# Patient Record
Sex: FEMALE | Race: BLACK OR AFRICAN AMERICAN | NOT HISPANIC OR LATINO | URBAN - NONMETROPOLITAN AREA
[De-identification: names, ages, dates, MRNs, and addresses within clinical notes are randomized per-mention and may not be internally consistent; named-entity substitution may affect disease eponyms.]

---

## 2019-03-12 ENCOUNTER — IMPORTED ENCOUNTER (OUTPATIENT)
Dept: URBAN - NONMETROPOLITAN AREA CLINIC 1 | Facility: CLINIC | Age: 64
End: 2019-03-12

## 2019-03-12 PROBLEM — E11.9: Noted: 2019-03-12

## 2019-03-12 PROBLEM — H25.11: Noted: 2021-01-13

## 2019-03-12 PROBLEM — H25.812: Noted: 2021-01-13

## 2019-03-12 PROBLEM — H25.813: Noted: 2021-01-13

## 2019-03-12 PROCEDURE — 99204 OFFICE O/P NEW MOD 45 MIN: CPT

## 2019-03-12 PROCEDURE — 92250 FUNDUS PHOTOGRAPHY W/I&R: CPT

## 2019-03-12 NOTE — PATIENT DISCUSSION
NIDDM s DR LARA-Stressed the importance of keeping blood sugars under control blood pressure under control and weight normalization and regular visits with PCP. -Explained the possible effects of poorly controlled diabetes and the damage that diabetes can cause to ocular health. -Patient to check HgbA1C.-Pt instructed to contact our office with any vision changes.

## 2021-01-13 ENCOUNTER — IMPORTED ENCOUNTER (OUTPATIENT)
Dept: URBAN - NONMETROPOLITAN AREA CLINIC 1 | Facility: CLINIC | Age: 66
End: 2021-01-13

## 2021-01-13 PROCEDURE — 99214 OFFICE O/P EST MOD 30 MIN: CPT

## 2021-01-13 PROCEDURE — 92250 FUNDUS PHOTOGRAPHY W/I&R: CPT

## 2021-01-13 PROCEDURE — 92015 DETERMINE REFRACTIVE STATE: CPT

## 2021-01-13 NOTE — PATIENT DISCUSSION
NIDDM s DR LARA-Stressed the importance of keeping blood sugars under control blood pressure under control and weight normalization and regular visits with PCP. -Explained the possible effects of poorly controlled diabetes and the damage that diabetes can cause to ocular health. -Patient to check HgbA1C.-Pt instructed to contact our office with any vision changes. Cataract OU-Not yet surgical. -Reviewed symptoms of advancing cataract growth such as glare and halos and decreased vision.-Continue to monitor for now. Pt will notify us if any new symptoms develop.

## 2022-04-09 ASSESSMENT — TONOMETRY
OD_IOP_MMHG: 18
OS_IOP_MMHG: 15
OD_IOP_MMHG: 15
OS_IOP_MMHG: 18

## 2022-04-09 ASSESSMENT — VISUAL ACUITY
OS_SC: 20/25
OD_SC: 20/20 -1
OD_SC: 20/25
OS_SC: 20/20 -2

## 2022-10-21 ENCOUNTER — FOLLOW UP (OUTPATIENT)
Dept: RURAL CLINIC 3 | Facility: CLINIC | Age: 67
End: 2022-10-21

## 2022-10-21 DIAGNOSIS — H52.4: ICD-10-CM

## 2022-10-21 DIAGNOSIS — E11.9: ICD-10-CM

## 2022-10-21 DIAGNOSIS — H25.813: ICD-10-CM

## 2022-10-21 PROCEDURE — 92250 FUNDUS PHOTOGRAPHY W/I&R: CPT

## 2022-10-21 PROCEDURE — 99214 OFFICE O/P EST MOD 30 MIN: CPT

## 2022-10-21 PROCEDURE — 92015 DETERMINE REFRACTIVE STATE: CPT

## 2022-10-21 ASSESSMENT — VISUAL ACUITY
OD_CC: 20/25-1
OS_CC: 20/20-2

## 2022-10-21 ASSESSMENT — TONOMETRY
OS_IOP_MMHG: 13
OD_IOP_MMHG: 13

## 2023-10-31 ENCOUNTER — EMERGENCY VISIT (OUTPATIENT)
Dept: RURAL CLINIC 3 | Facility: CLINIC | Age: 68
End: 2023-10-31

## 2023-10-31 DIAGNOSIS — H10.423: ICD-10-CM

## 2023-10-31 PROCEDURE — 99213 OFFICE O/P EST LOW 20 MIN: CPT

## 2023-10-31 ASSESSMENT — TONOMETRY
OS_IOP_MMHG: 14
OD_IOP_MMHG: 14

## 2023-10-31 ASSESSMENT — VISUAL ACUITY
OD_SC: 20/60
OD_PH: 20/30
OS_SC: 20/40-1
OS_PH: 20/30